# Patient Record
Sex: FEMALE | URBAN - METROPOLITAN AREA
[De-identification: names, ages, dates, MRNs, and addresses within clinical notes are randomized per-mention and may not be internally consistent; named-entity substitution may affect disease eponyms.]

---

## 2020-08-20 ENCOUNTER — APPOINTMENT (RX ONLY)
Dept: URBAN - METROPOLITAN AREA CLINIC 45 | Facility: CLINIC | Age: 70
Setting detail: DERMATOLOGY
End: 2020-08-20

## 2020-08-20 DIAGNOSIS — I87.2 VENOUS INSUFFICIENCY (CHRONIC) (PERIPHERAL): ICD-10-CM

## 2020-08-20 DIAGNOSIS — L85.3 XEROSIS CUTIS: ICD-10-CM

## 2020-08-20 PROCEDURE — 99203 OFFICE O/P NEW LOW 30 MIN: CPT

## 2020-08-20 PROCEDURE — ? TREATMENT REGIMEN

## 2020-08-20 PROCEDURE — ? COUNSELING

## 2020-08-20 PROCEDURE — ? PRESCRIPTION

## 2020-08-20 RX ORDER — HALOBETASOL PROPIONATE 0.5 MG/G
AEROSOL, FOAM TOPICAL
Qty: 1 | Refills: 3 | Status: ERX | COMMUNITY
Start: 2020-08-20

## 2020-08-20 RX ORDER — SULFAMETHOXAZOLE AND TRIMETHOPRIM 800; 160 MG/1; MG/1
TABLET ORAL
Qty: 14 | Refills: 0 | Status: ERX | COMMUNITY
Start: 2020-08-20

## 2020-08-20 RX ADMIN — SULFAMETHOXAZOLE AND TRIMETHOPRIM: 800; 160 TABLET ORAL at 00:00

## 2020-08-20 RX ADMIN — HALOBETASOL PROPIONATE: 0.5 AEROSOL, FOAM TOPICAL at 00:00

## 2020-08-20 ASSESSMENT — LOCATION ZONE DERM
LOCATION ZONE: TRUNK
LOCATION ZONE: LEG

## 2020-08-20 ASSESSMENT — LOCATION DETAILED DESCRIPTION DERM
LOCATION DETAILED: SUPERIOR THORACIC SPINE
LOCATION DETAILED: RIGHT PROXIMAL PRETIBIAL REGION
LOCATION DETAILED: LEFT PROXIMAL PRETIBIAL REGION

## 2020-08-20 ASSESSMENT — LOCATION SIMPLE DESCRIPTION DERM
LOCATION SIMPLE: LEFT PRETIBIAL REGION
LOCATION SIMPLE: RIGHT PRETIBIAL REGION
LOCATION SIMPLE: UPPER BACK

## 2020-08-20 NOTE — PROCEDURE: TREATMENT REGIMEN
Detail Level: Zone
Plan: Location: right lower leg \\nPrescribed: Lexette topical foam (Patient is to apply to affected areas daily as needed for flares. Avoiding the face)\\nBactrim DS 800mg-160mg (Patient is to take one tablet twice daily for 7 days)\\nPharmacy: [facility] [address] \\n\\nPatient presents today to receive treatment for Cellulitis located on her right lower leg.\\nShe states when she was seen by her Vascular surgeon to be treated for Scleroderma, he mentioned he would not be able to treat veins without her Cellulitis being treated first.\\nHe insisted she see a dermatologist so that condition can be treated immediately. \\nIt is clear upon examination she has Stasis Dermatitis, as percussion I will be prescribing an oral antibiotic for her to take twice daily for 7 days and in addition I will be prescribing Lexette topical foam for her to apply to affected area to help with inflammation and irritation.\\Jemma my opinion I do not see a issue with her veins being treated, I actually feel as though treatment will improve red erythema that she presents with today.\\nDiscussed with the patient Stasis dermatitis is a long-term condition that causes inflammation, ulcers, and itchy skin on the lower legs. It often occurs in people who have underlying conditions that affect blood flow in the legs, such as chronic venous insufficiency, varicose veins, deep vein thrombosis (DVT), and congestive heart failure.\\n\\nPLAN: \\n1. PATIENT IS TO TAKE ORAL BACTRIM DS 800MG-160MG TWICE DAILY FOR 7 DAYS\\n2. PATIENT IS TO PURCHASE OVER THE COUNTER SUPPORT HOSE AT LEAST 8-12 MM AND WEAR DAILY TO HELP WITH CIRCULATION \\n3. PATIENT IS TO APPLY LEXETTE TOPICAL FOAM ON AFFECTED AREAS AVOIDING THE FACE\\n4. PATIENT IS TO AVOID SOAP ON ENTIRE BODY AND ONLY LATHER TO PITS AND PRIVATES AS HER SKIN BARRIER IS BROKEN DOWN ON LEGS DUE TO POOR CIRCULATION.\\n5. PATIENT WAS PROVIDED WITH A COUPON FOR CERAVE TOPICAL MOISTURIZING CREAM AT TODAY'S VISIT, SHE IS TO APPLY DAILY TO LEGS AFTER SHOWER.\\n6. LASTLY PATIENT WAS INSTRUCTED TO ELEVATE LEGS DAILY TO HELP WITH CIRCULATION.  \\n\\nPatient is to continue current regimen and follow up in 3 months \\n\\n\\n\\n\\n\\n\\n\\n\\n\\n\\n\\n

## 2020-08-26 ENCOUNTER — RX ONLY (OUTPATIENT)
Age: 70
Setting detail: RX ONLY
End: 2020-08-26

## 2020-08-26 RX ORDER — CLOBETASOL PROPIONATE 0.5 MG/G
CREAM TOPICAL
Qty: 1 | Refills: 3 | Status: ERX | COMMUNITY
Start: 2020-08-26

## 2020-12-02 ENCOUNTER — APPOINTMENT (RX ONLY)
Dept: URBAN - METROPOLITAN AREA CLINIC 45 | Facility: CLINIC | Age: 70
Setting detail: DERMATOLOGY
End: 2020-12-02

## 2020-12-02 DIAGNOSIS — M79.3 PANNICULITIS, UNSPECIFIED: ICD-10-CM

## 2020-12-02 DIAGNOSIS — L85.3 XEROSIS CUTIS: ICD-10-CM

## 2020-12-02 PROBLEM — I83.11 VARICOSE VEINS OF RIGHT LOWER EXTREMITY WITH INFLAMMATION: Status: ACTIVE | Noted: 2020-12-02

## 2020-12-02 PROCEDURE — ? TREATMENT REGIMEN

## 2020-12-02 PROCEDURE — ? COUNSELING

## 2020-12-02 PROCEDURE — ? PRESCRIPTION

## 2020-12-02 PROCEDURE — 99213 OFFICE O/P EST LOW 20 MIN: CPT

## 2020-12-02 RX ORDER — CLOBETASOL PROPIONATE 0.5 MG/ML
SOLUTION TOPICAL
Qty: 1 | Refills: 2 | Status: ERX | COMMUNITY
Start: 2020-12-02

## 2020-12-02 RX ADMIN — CLOBETASOL PROPIONATE: 0.5 SOLUTION TOPICAL at 00:00

## 2020-12-02 ASSESSMENT — LOCATION SIMPLE DESCRIPTION DERM
LOCATION SIMPLE: RIGHT PRETIBIAL REGION
LOCATION SIMPLE: UPPER BACK

## 2020-12-02 ASSESSMENT — SEVERITY ASSESSMENT: SEVERITY: MILD TO MODERATE

## 2020-12-02 ASSESSMENT — LOCATION DETAILED DESCRIPTION DERM
LOCATION DETAILED: RIGHT DISTAL PRETIBIAL REGION
LOCATION DETAILED: SUPERIOR THORACIC SPINE

## 2020-12-02 ASSESSMENT — LOCATION ZONE DERM
LOCATION ZONE: LEG
LOCATION ZONE: TRUNK

## 2020-12-02 ASSESSMENT — PAIN INTENSITY VAS: HOW INTENSE IS YOUR PAIN 0 BEING NO PAIN, 10 BEING THE MOST SEVERE PAIN POSSIBLE?: 5/10 PAIN

## 2020-12-02 NOTE — PROCEDURE: TREATMENT REGIMEN
Detail Level: Zone
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 However she feels that her stasis dermatitis does not seem to be improving. \\nEvaluated her right lower leg today and it is tender to the touch and lichenified.\\nWe previously saw her in August and during that time she was prescribed Bactrim oral antibiotic and using the Lexette foam. \\nShe is still using the Clobetasol cream but states that her dermatitis hasn’t helped \\n\\nPatient mentioned that she has hydrocephalus (diabetes insipidus) in which she has to increase her salt intake. She mentioned that because she has to pee often, it does affect her sleep cycle \\nPatient feels that maybe she should be on a long term oral steroid in which I discussed with her the side effects from taking it such as Osteoporosis, diabetes, affects blood sugar, affects the immune system, and can cause cataracts.\\nShe will be seeing her neurologist today to follow up on her hydrocephalus. \\n\\nToday, I diagnosed her with Lypodermatosclerosis which is a circulatory issue which is related to Morphea\\n1) I instructed the importance of elevating her legs and wearing compression stockings \\n2) will prescribe Clobetasol solution which is a stronger steroid than Clobetasol cream \\n3) continue using Cerave MC to help reestablish a healthy skin barrier \\n\\nWill have her follow up in 3-4 months to re-evaluate \\n————————————————————————————-\\nPREVIOUS VISIT 8/20/2020\\n\\nPatient presents today to receive treatment for Cellulitis located on her right lower leg.\\nShe states when she was seen by her Vascular surgeon to be treated for Scleroderma, he mentioned he would not be able to treat veins without her Cellulitis being treated first.\\nHe insisted she see a dermatologist so that condition can be treated immediately. \\nIt is clear upon examination she has Stasis Dermatitis, as percussion I will be prescribing an oral antibiotic for her to take twice daily for 7 days and in addition I will be prescribing Lexette topical foam for her to apply to affected area to help with inflammation and irritation.\\Jemma my opinion I do not see a issue with her veins being treated, I actually feel as though treatment will improve red erythema that she presents with today.\\nDiscussed with the patient Stasis dermatitis is a long-term condition that causes inflammation, ulcers, and itchy skin on the lower legs. It often occurs in people who have underlying conditions that affect blood flow in the legs, such as chronic venous insufficiency, varicose veins, deep vein thrombosis (DVT), and congestive heart failure.\\n\\nPLAN: \\n1. PATIENT IS TO TAKE ORAL BACTRIM DS 800MG-160MG TWICE DAILY FOR 7 DAYS\\n2. PATIENT IS TO PURCHASE OVER THE COUNTER SUPPORT HOSE AT LEAST 8-12 MM AND WEAR DAILY TO HELP WITH CIRCULATION \\n3. PATIENT IS TO APPLY LEXETTE TOPICAL FOAM ON AFFECTED AREAS AVOIDING THE FACE\\n4. PATIENT IS TO AVOID SOAP ON ENTIRE BODY AND ONLY LATHER TO PITS AND PRIVATES AS HER SKIN BARRIER IS BROKEN DOWN ON LEGS DUE TO POOR CIRCULATION.\\n5. PATIENT WAS PROVIDED WITH A COUPON FOR CERAVE TOPICAL MOISTURIZING CREAM AT TODAY'S VISIT, SHE IS TO APPLY DAILY TO LEGS AFTER SHOWER.\\n6. LASTLY PATIENT WAS INSTRUCTED TO ELEVATE LEGS DAILY TO HELP WITH CIRCULATION.  \\n\\nPatient is to continue current regimen and follow up in 3 months

## 2021-03-03 ENCOUNTER — APPOINTMENT (RX ONLY)
Dept: URBAN - METROPOLITAN AREA CLINIC 45 | Facility: CLINIC | Age: 71
Setting detail: DERMATOLOGY
End: 2021-03-03

## 2021-03-03 DIAGNOSIS — L85.3 XEROSIS CUTIS: ICD-10-CM

## 2021-03-03 DIAGNOSIS — L82.0 INFLAMED SEBORRHEIC KERATOSIS: ICD-10-CM

## 2021-03-03 DIAGNOSIS — D22 MELANOCYTIC NEVI: ICD-10-CM

## 2021-03-03 DIAGNOSIS — M79.3 PANNICULITIS, UNSPECIFIED: ICD-10-CM

## 2021-03-03 PROBLEM — I83.11 VARICOSE VEINS OF RIGHT LOWER EXTREMITY WITH INFLAMMATION: Status: ACTIVE | Noted: 2021-03-03

## 2021-03-03 PROBLEM — D22.4 MELANOCYTIC NEVI OF SCALP AND NECK: Status: ACTIVE | Noted: 2021-03-03

## 2021-03-03 PROCEDURE — ? LIQUID NITROGEN

## 2021-03-03 PROCEDURE — ? COUNSELING

## 2021-03-03 PROCEDURE — ? PRESCRIPTION

## 2021-03-03 PROCEDURE — 99213 OFFICE O/P EST LOW 20 MIN: CPT | Mod: 25

## 2021-03-03 PROCEDURE — ? TREATMENT REGIMEN

## 2021-03-03 PROCEDURE — 17110 DESTRUCTION B9 LES UP TO 14: CPT

## 2021-03-03 RX ORDER — CLOBETASOL PROPIONATE 0.5 MG/ML
SOLUTION TOPICAL
Qty: 1 | Refills: 2 | Status: ERX

## 2021-03-03 ASSESSMENT — LOCATION DETAILED DESCRIPTION DERM
LOCATION DETAILED: LEFT INFERIOR OCCIPITAL SCALP
LOCATION DETAILED: MID-OCCIPITAL SCALP
LOCATION DETAILED: RIGHT DISTAL PRETIBIAL REGION
LOCATION DETAILED: SUPERIOR THORACIC SPINE

## 2021-03-03 ASSESSMENT — LOCATION ZONE DERM
LOCATION ZONE: LEG
LOCATION ZONE: SCALP
LOCATION ZONE: TRUNK

## 2021-03-03 ASSESSMENT — LOCATION SIMPLE DESCRIPTION DERM
LOCATION SIMPLE: RIGHT PRETIBIAL REGION
LOCATION SIMPLE: POSTERIOR SCALP
LOCATION SIMPLE: UPPER BACK

## 2021-03-03 NOTE — PROCEDURE: LIQUID NITROGEN
Medical Necessity Information: It is in your best interest to select a reason for this procedure from the list below. All of these items fulfill various CMS LCD requirements except the new and changing color options.
Number Of Freeze-Thaw Cycles: 3 freeze-thaw cycles
Render Post-Care Instructions In Note?: no
Medical Necessity Clause: This procedure was medically necessary because the lesions that were treated were:
Post-Care Instructions: I reviewed with the patient in detail post-care instructions. Patient is to wear sunprotection, and avoid picking at any of the treated lesions. Pt may apply Vaseline to crusted or scabbing areas.
Detail Level: Detailed
Consent: The patient's consent was obtained including but not limited to risks of crusting, scabbing, blistering, scarring, darker or lighter pigmentary change, recurrence, incomplete removal and infection.

## 2021-03-03 NOTE — PROCEDURE: TREATMENT REGIMEN
Detail Level: Zone
Plan: Location: right lower leg \\nPreviously on: Lexette topical foam, Bactrim DS 800mg-160mg\\n\\nFollow up\\nLOV 12/2/2020\\nPhotos taken \\n\\nPt comes in today for follow up\\nShe states her leg is not as hard as how it use to feel before starting on the following regimen:\\nFirst she states she limits soap to her lower extremities and after bathing she uses Cetaphil MC\\nShe states she uses the Lexette Topical Foam twice daily and then applies an emollient \\nThe co,or of her leg is lighter but nit completely gone \\nShe states she elevates her legs twice a day \\nShe is still sensitive to touch but not as severe \\nShe comes today for further evaluation and continuation of treatment \\n\\nDiscussed with pt the following:\\n\\nDietary is good for her especially if she limits salt \\n——PT states she has to eat salt because she has problems with her electrolytes, SHE HAS MS\\nPt should use her compressions hoses\\nShe needs to limit soap\\nShe needs to exercise \\nWhen she exercises It’s called muscular milking and will help decrease the inflammation and redness\\n—Patient states she saw the vascular surgeon ([facility]) 10 days ago and states 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 \\nStaying on top of this regimen will benefit her in the long run even fir her other keg\\nPt will continue using the Lexette Topical Foam and emollients and moisturizers\\nI will refill her medications and will like to re evaluate in three months \\nHowever if she feels that her stasis dermatitis is not improving get in here sooner \\nPt voices understanding.\\n\\nFollow up 3 months \\n——————————————-\\n\\nEvaluated her right lower leg today and it is tender to the touch and lichenified.\\nWe previously saw her in August and during that time she was prescribed swelling\\nShe needs to elevate her legs\\nIt’s the blood dripping into the skin tissue and the inflammation damages the tissue \\nIf she continues this regimen it will help both of her kegs \\n\\n\\n Bactrim oral antibiotic and using the Lexette foam. \\nShe is still using the Clobetasol cream but states that her dermatitis hasn’t helped \\n\\nPatient mentioned that she has hydrocephalus (diabetes insipidus) in which she has to increase her salt intake. She mentioned that because she has to pee often, it does affect her sleep cycle \\nPatient feels that maybe she should be on a long term oral steroid in which I discussed with her the side effects from taking it such as Osteoporosis, diabetes, affects blood sugar, affects the immune system, and can cause cataracts.\\nSdalton will be seeing her neurologist today to follow up on her hydrocephalus. \\n\\nToday, I diagnosed her with Lypodermatosclerosis which is a circulatory issue which is related to Morphea\\n1) I instructed the importance of elevating her legs and wearing compression stockings \\n2) will prescribe Clobetasol solution which is a stronger steroid than Clobetasol cream \\n3) continue using Cerave MC to help reestablish a healthy skin barrier \\n\\nWill have her follow up in 3-4 months to re-evaluate \\n————————————————————————————-\\nPREVIOUS VISIT 8/20/2020\\n\\nPcathryn presents today to receive treatment for Cellulitis located on her right lower leg.\\nShe states when she was seen by her Vascular surgeon to be treated for Scleroderma, he mentioned he would not be able to treat veins without her Cellulitis being treated first.\\nHe insisted she see a dermatologist so that condition can be treated immediately. \\nIt is clear upon examination she has Stasis Dermatitis, as percussion I will be prescribing an oral antibiotic for her to take twice daily for 7 days and in addition I will be prescribing Lexette topical foam for her to apply to affected area to help with inflammation and irritation.\\Jemma my opinion I do not see a issue with her veins being treated, I actually feel as though treatment will improve red erythema that she presents with today.\\nDiscussed with the patient Stasis dermatitis is a long-term condition that causes inflammation, ulcers, and itchy skin on the lower legs. It often occurs in people who have underlying conditions that affect blood flow in the legs, such as chronic venous insufficiency, varicose veins, deep vein thrombosis (DVT), and congestive heart failure.\\n\\nPLAN: \\n1. PATIENT IS TO TAKE ORAL BACTRIM DS 800MG-160MG TWICE DAILY FOR 7 DAYS\\n2. PATIENT IS TO PURCHASE OVER THE COUNTER SUPPORT HOSE AT LEAST 8-12 MM AND WEAR DAILY TO HELP WITH CIRCULATION \\n3. PATIENT IS TO APPLY LEXETTE TOPICAL FOAM ON AFFECTED AREAS AVOIDING THE FACE\\n4. PATIENT IS TO AVOID SOAP ON ENTIRE BODY AND ONLY LATHER TO PITS AND PRIVATES AS HER SKIN BARRIER IS BROKEN DOWN ON LEGS DUE TO POOR CIRCULATION.\\n5. PATIENT WAS PROVIDED WITH A COUPON FOR CERAVE TOPICAL MOISTURIZING CREAM AT TODAY'S VISIT, SHE IS TO APPLY DAILY TO LEGS AFTER SHOWER.\\n6. LASTLY PATIENT WAS INSTRUCTED TO ELEVATE LEGS DAILY TO HELP WITH CIRCULATION.  \\n\\nPatient is to continue current regimen and follow up in 3 months

## 2021-08-30 ENCOUNTER — RX ONLY (OUTPATIENT)
Age: 71
Setting detail: RX ONLY
End: 2021-08-30

## 2021-08-30 RX ORDER — CLOBETASOL PROPIONATE 0.5 MG/G
CREAM TOPICAL
Qty: 60 | Refills: 3 | Status: ERX